# Patient Record
Sex: FEMALE | URBAN - METROPOLITAN AREA
[De-identification: names, ages, dates, MRNs, and addresses within clinical notes are randomized per-mention and may not be internally consistent; named-entity substitution may affect disease eponyms.]

---

## 2019-07-02 ENCOUNTER — APPOINTMENT (OUTPATIENT)
Age: 39
Setting detail: DERMATOLOGY
End: 2019-07-02

## 2019-07-02 DIAGNOSIS — L71.8 OTHER ROSACEA: ICD-10-CM

## 2019-07-02 PROCEDURE — 99203 OFFICE O/P NEW LOW 30 MIN: CPT

## 2019-07-02 PROCEDURE — OTHER MIPS QUALITY: OTHER

## 2019-07-02 PROCEDURE — OTHER COUNSELING: OTHER

## 2019-07-02 PROCEDURE — OTHER PRESCRIPTION: OTHER

## 2019-07-02 PROCEDURE — OTHER TREATMENT REGIMEN: OTHER

## 2019-07-02 RX ORDER — SULFACETAMIDE SODIUM, SULFUR 100; 20 MG/G; MG/G
EMULSION TOPICAL
Qty: 1 | Refills: 2 | Status: CANCELLED

## 2019-07-02 RX ORDER — DOXYCYCLINE HYCLATE 100 MG/1
CAPSULE, GELATIN COATED ORAL
Qty: 30 | Refills: 2 | Status: CANCELLED

## 2019-07-02 RX ORDER — IVERMECTIN 10 MG/G
CREAM TOPICAL
Qty: 1 | Refills: 2 | Status: CANCELLED

## 2019-07-02 ASSESSMENT — LOCATION SIMPLE DESCRIPTION DERM
LOCATION SIMPLE: RIGHT CHEEK
LOCATION SIMPLE: LEFT CHEEK

## 2019-07-02 ASSESSMENT — LOCATION DETAILED DESCRIPTION DERM
LOCATION DETAILED: LEFT INFERIOR CENTRAL MALAR CHEEK
LOCATION DETAILED: RIGHT CENTRAL MALAR CHEEK

## 2019-07-02 ASSESSMENT — LOCATION ZONE DERM: LOCATION ZONE: FACE

## 2019-07-02 NOTE — HPI: RASH
How Severe Is Your Rash?: mild
Is This A New Presentation, Or A Follow-Up?: Rash
Additional History: Patient states she has had a similar rash in the past but it significantly flared when her RX for Phentermine was increased.

## 2019-07-02 NOTE — PROCEDURE: TREATMENT REGIMEN
Action 3: Continue
Discontinue Regimen: OTC hydrocortisone and antibiotic ointment (neosporin)
Detail Level: Zone
Initiate Regimen: Doxycycline Hyclate 100 mg 1 PO QD, Avar cleanser LS - wash face twice daily, Soolantra 1% cream - apply a thin layer to a clean dry face QAM (use as a moisturizer).\\nCetaphil or CeraVe moisturizer - use on face QHS as needed for dryness.
Samples Given: Avar cleanser LS